# Patient Record
Sex: MALE | Race: WHITE | NOT HISPANIC OR LATINO | Employment: FULL TIME | URBAN - METROPOLITAN AREA
[De-identification: names, ages, dates, MRNs, and addresses within clinical notes are randomized per-mention and may not be internally consistent; named-entity substitution may affect disease eponyms.]

---

## 2017-09-06 ENCOUNTER — HISTORICAL (OUTPATIENT)
Dept: LAB | Facility: HOSPITAL | Age: 57
End: 2017-09-06

## 2017-09-06 LAB
CREAT UR-MCNC: 33.9 MG/DL
PROT UR STRIP-MCNC: 5.1 MG/DL

## 2020-10-20 ENCOUNTER — HISTORICAL (OUTPATIENT)
Dept: ADMINISTRATIVE | Facility: HOSPITAL | Age: 60
End: 2020-10-20

## 2020-10-20 LAB
ABS NEUT (OLG): 2.8 X10(3)/MCL (ref 2.1–9.2)
ALBUMIN SERPL-MCNC: 4.4 GM/DL (ref 3.4–5)
ALBUMIN/GLOB SERPL: 1.52 {RATIO} (ref 1.5–2.5)
ALP SERPL-CCNC: 66 UNIT/L (ref 38–126)
ALT SERPL-CCNC: 43 UNIT/L (ref 7–52)
AST SERPL-CCNC: 27 UNIT/L (ref 15–37)
BILIRUB SERPL-MCNC: 0.7 MG/DL (ref 0.2–1)
BILIRUBIN DIRECT+TOT PNL SERPL-MCNC: 0.1 MG/DL (ref 0–0.5)
BILIRUBIN DIRECT+TOT PNL SERPL-MCNC: 0.6 MG/DL
BUN SERPL-MCNC: 11 MG/DL (ref 7–18)
CALCIUM SERPL-MCNC: 9.5 MG/DL (ref 8.5–10.1)
CHLORIDE SERPL-SCNC: 101 MMOL/L (ref 98–107)
CHOLEST SERPL-MCNC: 235 MG/DL (ref 0–200)
CHOLEST/HDLC SERPL: 7.1 {RATIO}
CO2 SERPL-SCNC: 26 MMOL/L (ref 21–32)
CREAT SERPL-MCNC: 0.88 MG/DL (ref 0.6–1.3)
ERYTHROCYTE [DISTWIDTH] IN BLOOD BY AUTOMATED COUNT: 12.1 % (ref 11.5–17)
EST. AVERAGE GLUCOSE BLD GHB EST-MCNC: 126 MG/DL
GLOBULIN SER-MCNC: 2.9 GM/DL (ref 1.2–3)
GLUCOSE SERPL-MCNC: 97 MG/DL (ref 74–106)
HBA1C MFR BLD: 6 % (ref 4.4–6.4)
HCT VFR BLD AUTO: 42.5 % (ref 42–52)
HDLC SERPL-MCNC: 33 MG/DL (ref 35–60)
HGB BLD-MCNC: 14.3 GM/DL (ref 14–18)
LDLC SERPL CALC-MCNC: 149 MG/DL (ref 0–129)
LYMPHOCYTES # BLD AUTO: 2.1 X10(3)/MCL (ref 0.6–3.4)
LYMPHOCYTES NFR BLD AUTO: 40.9 % (ref 13–40)
MCH RBC QN AUTO: 29.2 PG (ref 27–31.2)
MCHC RBC AUTO-ENTMCNC: 34 GM/DL (ref 32–36)
MCV RBC AUTO: 87 FL (ref 80–94)
MONOCYTES # BLD AUTO: 0.2 X10(3)/MCL (ref 0.1–1.3)
MONOCYTES NFR BLD AUTO: 4.4 % (ref 0.1–24)
NEUTROPHILS NFR BLD AUTO: 54.7 % (ref 47–80)
PLATELET # BLD AUTO: 242 X10(3)/MCL (ref 130–400)
PMV BLD AUTO: 8.4 FL (ref 9.4–12.4)
POTASSIUM SERPL-SCNC: 4.1 MMOL/L (ref 3.5–5.1)
PROT SERPL-MCNC: 7.3 GM/DL (ref 6.4–8.2)
PSA SERPL-MCNC: 1.73 NG/ML (ref 0–3.5)
RBC # BLD AUTO: 4.9 X10(6)/MCL (ref 4.7–6.1)
SODIUM SERPL-SCNC: 136 MMOL/L (ref 136–145)
TRIGL SERPL-MCNC: 241 MG/DL (ref 30–150)
TSH SERPL-ACNC: 1.41 MIU/ML (ref 0.35–4.94)
VLDLC SERPL CALC-MCNC: 48.2 MG/DL
WBC # SPEC AUTO: 5.1 X10(3)/MCL (ref 4.5–11.5)

## 2021-01-18 ENCOUNTER — HISTORICAL (OUTPATIENT)
Dept: ADMINISTRATIVE | Facility: HOSPITAL | Age: 61
End: 2021-01-18

## 2021-01-18 LAB
ALBUMIN SERPL-MCNC: 4.4 GM/DL (ref 3.4–5)
ALBUMIN/GLOB SERPL: 1.63 {RATIO} (ref 1.5–2.5)
ALP SERPL-CCNC: 71 UNIT/L (ref 38–126)
ALT SERPL-CCNC: 39 UNIT/L (ref 7–52)
AST SERPL-CCNC: 25 UNIT/L (ref 15–37)
BILIRUB SERPL-MCNC: 0.6 MG/DL (ref 0.2–1)
BILIRUBIN DIRECT+TOT PNL SERPL-MCNC: 0.1 MG/DL (ref 0–0.5)
BILIRUBIN DIRECT+TOT PNL SERPL-MCNC: 0.5 MG/DL
BUN SERPL-MCNC: 9 MG/DL (ref 7–18)
CALCIUM SERPL-MCNC: 9.3 MG/DL (ref 8.5–10.1)
CHLORIDE SERPL-SCNC: 104 MMOL/L (ref 98–107)
CHOLEST SERPL-MCNC: 118 MG/DL (ref 0–200)
CHOLEST/HDLC SERPL: 3.6 {RATIO}
CO2 SERPL-SCNC: 26 MMOL/L (ref 21–32)
CREAT SERPL-MCNC: 0.85 MG/DL (ref 0.6–1.3)
GLOBULIN SER-MCNC: 2.7 GM/DL (ref 1.2–3)
GLUCOSE SERPL-MCNC: 135 MG/DL (ref 74–106)
HDLC SERPL-MCNC: 33 MG/DL (ref 35–60)
LDLC SERPL CALC-MCNC: 68 MG/DL (ref 0–129)
POTASSIUM SERPL-SCNC: 4 MMOL/L (ref 3.5–5.1)
PROT SERPL-MCNC: 7.1 GM/DL (ref 6.4–8.2)
SODIUM SERPL-SCNC: 138 MMOL/L (ref 136–145)
TRIGL SERPL-MCNC: 146 MG/DL (ref 30–150)
VLDLC SERPL CALC-MCNC: 29.2 MG/DL

## 2021-07-19 ENCOUNTER — HISTORICAL (OUTPATIENT)
Dept: ADMINISTRATIVE | Facility: HOSPITAL | Age: 61
End: 2021-07-19

## 2021-07-19 LAB
ALBUMIN SERPL-MCNC: 4.5 GM/DL (ref 3.4–5)
ALBUMIN/GLOB SERPL: 1.61 {RATIO} (ref 1.5–2.5)
ALP SERPL-CCNC: 114 UNIT/L (ref 38–126)
ALT SERPL-CCNC: 60 UNIT/L (ref 7–52)
AST SERPL-CCNC: 33 UNIT/L (ref 15–37)
BILIRUB SERPL-MCNC: 0.6 MG/DL (ref 0.2–1)
BILIRUBIN DIRECT+TOT PNL SERPL-MCNC: 0.1 MG/DL (ref 0–0.5)
BILIRUBIN DIRECT+TOT PNL SERPL-MCNC: 0.5 MG/DL
BUN SERPL-MCNC: 10 MG/DL (ref 7–18)
CALCIUM SERPL-MCNC: 9.4 MG/DL (ref 8.5–10.1)
CHLORIDE SERPL-SCNC: 101 MMOL/L (ref 98–107)
CHOLEST SERPL-MCNC: 123 MG/DL (ref 0–200)
CHOLEST/HDLC SERPL: 3.8 {RATIO}
CO2 SERPL-SCNC: 27 MMOL/L (ref 21–32)
CREAT SERPL-MCNC: 0.83 MG/DL (ref 0.6–1.3)
EST. AVERAGE GLUCOSE BLD GHB EST-MCNC: 143 MG/DL
GLOBULIN SER-MCNC: 2.8 GM/DL (ref 1.2–3)
GLUCOSE SERPL-MCNC: 141 MG/DL (ref 74–106)
HBA1C MFR BLD: 6.6 % (ref 4.4–6.4)
HDLC SERPL-MCNC: 32 MG/DL (ref 35–60)
LDLC SERPL CALC-MCNC: 71 MG/DL (ref 0–129)
POTASSIUM SERPL-SCNC: 4.5 MMOL/L (ref 3.5–5.1)
PROT SERPL-MCNC: 7.3 GM/DL (ref 6.4–8.2)
SODIUM SERPL-SCNC: 136 MMOL/L (ref 136–145)
TRIGL SERPL-MCNC: 138 MG/DL (ref 30–150)
VLDLC SERPL CALC-MCNC: 27.6 MG/DL

## 2022-01-19 LAB — EST CREAT CLEARANCE SER (OHS): 130.34 ML/MIN

## 2022-04-10 ENCOUNTER — HISTORICAL (OUTPATIENT)
Dept: ADMINISTRATIVE | Facility: HOSPITAL | Age: 62
End: 2022-04-10

## 2022-04-27 VITALS
DIASTOLIC BLOOD PRESSURE: 84 MMHG | OXYGEN SATURATION: 96 % | WEIGHT: 209.44 LBS | SYSTOLIC BLOOD PRESSURE: 131 MMHG | BODY MASS INDEX: 31.02 KG/M2 | HEIGHT: 69 IN

## 2022-05-03 NOTE — HISTORICAL [facility]ER
This is a historical note converted from Pedro. Formatting and pictures may have been removed.  Please reference Pedro for original formatting and attached multimedia. Chief Complaint  6 MTH F/U  History of Present Illness  History of prediabetes.? Hemoglobin A1c 6.0?in October.  Hypercholesterolemia- LDL 68 in January with Crstor 20 mg daily.?? CT calcium score performed 11/23/20??which was 611 placing patient above the 90th percentile.??Pt evaluated by cardiologist, Dr. Calderon in December.? Echo, carotid us, LE arterial US and treadmill stress testing.? No significant concerns noted.? Overall diet has?improved to be more heart healthy.? Tolerating Crestor well without any side effects.?  Hypertension?controlled with?amlodipine 10 mg daily and metoprolol  mg daily.  Review of Systems  Constitutional:?No weight loss, no fever, no fatigue, no chills, no night sweats,?no weakness  Eyes:?No blurred vision,?no redness,?no drainage,?no ocular?pain  HEENT:?No sore throat,?no ear pain, no sinus pressure, no nasal congestion, no rhinorrhea, no postnasal drip  Respiratory:?No cough, no wheezing, no sputum production, no shortness of breath  Cardiovascular:?No chest pain, no palpitations, no dyspnea on exertion,?no orthopnea  Gastrointestinal:?No nausea, no vomiting, no abdominal pain, no diarrhea,?no constipation, no melena,?no hematochezia  Genitourinary:?No dysuria, no hematuria, no frequency, no urgency, no incontinence, no discharge  Musculoskeletal:?No myalgias, no arthralgias, no weakness, no joint effusion, no edema  Integumentary:?No rashes, no hives, no itching, no lesions, no jaundice  Neurologic:?No headaches, no numbness, no tingling, no weakness, no dizziness  Psychiatric:?No anxiety, no irritability, no depression,?no suicidal ideations, no?homicidal ideations,?no delusions, no hallucinations  Endocrine:?No polyuria, no polydipsia, no polyphagia  Hematology:?No bruising, no lymphadenopathy,?no  paleness  ?  Physical Exam  Vitals & Measurements  HR:?82(Peripheral)? BP:?125/86? SpO2:?96%?  HT:?175.00?cm? WT:?98.300?kg? BMI:?32.1?  General:?Well developed, Well-nourished, in No acute distress, A&O x 4  Cardiovascular:?Regular rate and rhythm, No murmurs, No gallops, No rubs  Respiratory:?Clear to auscultation bilaterally, No wheezes, No rhonchi, No?crackles  Abdomen:? Soft, Nontender, No hepatosplenomegaly, Normal and equal bowel sounds, No masses, No rebound, No guarding  Musculoskeletal:? No tenderness, FROM, No joint abnormality, No clubbing, No cyanosis,No?edema  Integumentary:?No rashes or skin lesions  Assessment/Plan  1.?HTN (hypertension)?I10  ?Continue?metoprolol  mg daily and amlodipine 10 mg daily  Ordered:  Clinic Follow up, *Est. 01/19/22 3:00:00 CST, Wellness Physical, Order for future visit, HTN (hypertension)  Hypercholesterolemia  Elevated coronary artery calcium score  Insulin resistance, HLink AFP  Comprehensive Metabolic Panel, Routine collect, 07/19/21 9:55:00 CDT, Blood, Stop date 07/19/21 9:55:00 CDT, Lab Collect, HTN (hypertension), J Luis Calderon MD, 07/19/21 9:55:00 CDT  Office/Outpatient Visit Level 4 Established 47951 PC, HTN (hypertension)  Hypercholesterolemia  Elevated coronary artery calcium score  Insulin resistance, HLINK AMB - AFP, 07/19/21 9:49:00 CDT  ?  2.?Hypercholesterolemia?E78.00  Ordered:  Clinic Follow up, *Est. 01/19/22 3:00:00 CST, Wellness Physical, Order for future visit, HTN (hypertension)  Hypercholesterolemia  Elevated coronary artery calcium score  Insulin resistance, HLink AFP  Lipid Panel, Routine collect, 07/19/21 9:55:00 CDT, Blood, Stop date 07/19/21 9:55:00 CDT, Lab Collect, Hypercholesterolemia, J Luis Calderon MD, 07/19/21 9:55:00 CDT  Office/Outpatient Visit Level 4 Established 00169 PC, HTN (hypertension)  Hypercholesterolemia  Elevated coronary artery calcium score  Insulin resistance, HLINK AMB - AFP, 07/19/21 9:49:00  CDT  ?  3.?Elevated coronary artery calcium score?R93.1  ?Continue risk factor reduction?and routine cardiology follow-up  Ordered:  Clinic Follow up, *Est. 01/19/22 3:00:00 CST, Wellness Physical, Order for future visit, HTN (hypertension)  Hypercholesterolemia  Elevated coronary artery calcium score  Insulin resistance, HLink AFP  Office/Outpatient Visit Level 4 Established [id number] PC, HTN (hypertension)  Hypercholesterolemia  Elevated coronary artery calcium score  Insulin resistance, HLINK AMB - AFP, 07/19/21 9:49:00 CDT  ?  4.?Insulin resistance?E88.81  ?Continue efforts to maintain a low carbohydrate diet and regular exercise  Ordered:  Clinic Follow up, *Est. 01/19/22 3:00:00 CST, Wellness Physical, Order for future visit, HTN (hypertension)  Hypercholesterolemia  Elevated coronary artery calcium score  Insulin resistance, HLink AFP  Hemoglobin A1c, Routine collect, 07/19/21 9:55:00 CDT, Blood, Stop date 07/19/21 9:55:00 CDT, Lab Collect, Insulin resistance, Kvng JC, J Luis, 07/19/21 9:55:00 CDT  Office/Outpatient Visit Level 4 Established 51979 PC, HTN (hypertension)  Hypercholesterolemia  Elevated coronary artery calcium score  Insulin resistance, HLINK AMB - AFP, 07/19/21 9:49:00 CDT  ?  Referrals  Clinic Follow up, *Est. 01/19/22 3:00:00 CST, Wellness Physical, Order for future visit, HTN (hypertension)  Hypercholesterolemia  Elevated coronary artery calcium score  Insulin resistance, HLink AFP  Clinic Follow up, Order for future visit   Problem List/Past Medical History  Ongoing  Elevated coronary artery calcium score  HTN (hypertension)  Hypercholesterolemia  Insulin resistance  Historical  SBO - Small bowel obstruction  Procedure/Surgical History  Colonoscopy (02/16/2016)  Cholecystectomy (08.2014)   Medications  amLODIPine 10 mg oral tablet, See Instructions  aspirin 81 mg oral Delayed Release (EC) tablet, 81 mg= 1 tab(s), Oral, Daily  Metoprolol Succinate  mg oral tablet, extended  release, See Instructions  rosuvastatin 20 mg oral tablet, 20 mg= 1 tab(s), Oral, Daily, 3 refills  Triple Strength Red Krill Oil, 1000 mg, Oral, BID  Vitamin C 250 mg oral tablet, 250 mg= 1 tab(s), Oral, Daily  Vitamin D3  Zinc, 140 mg, Oral, Daily  Allergies  lisinopril?(COUGH)  Social History  Abuse/Neglect  No, 07/19/2021  Alcohol - Denies Alcohol Use, 01/07/2016  Current, Beer, 1-2 times per month, Alcohol use interferes with work or home: No. Others hurt by drinking: No. Household alcohol concerns: No., 10/20/2020  Employment/School  Employed, Work/School description: ., 09/25/2018  Substance Use - Denies Substance Abuse, 01/07/2016  Tobacco - Denies Tobacco Use, 01/07/2016  Former smoker, quit more than 30 days ago, Cigarettes, No, 20 per day. 33 year(s). 17 Years (Age started). 48 Years (Age stopped)., 07/19/2021  Family History  Acute myocardial infarction.: Father.  CVA - Cerebrovascular accident: Father.  Cataract.: Mother.  Chronic kidney disease: Father.  Diabetes mellitus type 2: Father, Sister and Brother.  Hyperlipidemia.: Father.  Hypertension.: Mother and Father.  Hypothyroidism.: Mother.  Migraine: Mother.  Peripheral vascular disease.: Father.  Prediabetes: Mother.  Immunizations  Vaccine Date Status   tetanus/diphtheria/pertussis, acel(Tdap) 08/01/2016 Recorded   Health Maintenance  Health Maintenance  ???Pending?(in the next year)  ??? ??OverDue  ??? ? ? ?Influenza Vaccine due??10/01/20??and every 1??day(s)  ??? ? ? ?Alcohol Misuse Screening due??01/02/21??and every 1??year(s)  ??? ??Due?  ??? ? ? ?ADL Screening due??07/19/21??and every 1??year(s)  ??? ? ? ?Depression Screening due??07/19/21??Unknown Frequency  ??? ? ? ?Zoster Vaccine due??07/19/21??Unknown Frequency  ??? ??Due In Future?  ??? ? ? ?Obesity Screening not due until??01/01/22??and every 1??year(s)  ??? ? ? ?Diabetes Screening not due until??01/18/22??and every 1??year(s)  ??? ? ? ?Hypertension Management-BMP not due  until??01/18/22??and every 1??year(s)  ??? ? ? ?Aspirin Therapy for CVD Prevention not due until??01/18/22??and every 1??year(s)  ???Satisfied?(in the past 1 year)  ??? ??Satisfied?  ??? ? ? ?Aspirin Therapy for CVD Prevention on??01/18/21.  ??? ? ? ?Blood Pressure Screening on??07/19/21.??Satisfied by Elian Acosta MD, Richard A  ??? ? ? ?Body Mass Index Check on??07/19/21.??Satisfied by Cristal Hyde LPN  ??? ? ? ?Coronary Artery Disease Maintenance-Antiplatelet Agent Prescribed on??01/18/21.  ??? ? ? ?Diabetes Screening on??01/18/21.??Satisfied by Guerrero Laird  ??? ? ? ?Hypertension Management-BMP on??01/18/21.??Satisfied by Guerrero Laird  ??? ? ? ?Hypertension Management-Blood Pressure on??07/19/21.??Satisfied by Elian Acosta MD, Richard A  ??? ? ? ?Influenza Vaccine on??01/18/21.??Satisfied by Cristal Hyde LPN  ??? ? ? ?Lipid Screening on??01/18/21.??Satisfied by Guerrero Laird  ??? ? ? ?Obesity Screening on??07/19/21.??Satisfied by Cristal Hyde LPN  ?

## 2022-05-03 NOTE — HISTORICAL [facility]
This is a historical note converted from Pedro. Formatting and pictures may have been removed.  Please reference Pedro for original formatting and attached multimedia. Chief Complaint  3 MTH F/U  History of Present Illness  Prediabetes - HbA1C 6.0 10/20/20  Hyperchol -  10/20/20 and ASCVD 13.13%.? Pt sent for CT calcium score performed 11/23/20??which was 611 placing patient above the 90th percentile.??Pt started on Crestor 20 mg daily. ?Also recommend taking aspirin 81 mg daily.? Pt saw cardiologist, Dr. Calderon in December.? Echo, carotid us, LE arterial US and treadmill stress testing.? No significant concerns noted.? Overall diet has?improved to be more heart healthy.? Tolerating Crestor well without any side effects.? Patient has began?cardiovascular exercise routine as well.  Hypertension?controlled with?amlodipine 10 mg daily and metoprolol  mg daily.  Review of Systems  Constitutional:?No weight loss, no fever, no fatigue, no chills, no night sweats,?no weakness  Eyes:?No blurred vision,?no redness,?no drainage,?no ocular?pain  HEENT:?No sore throat,?no ear pain, no sinus pressure, no nasal congestion, no rhinorrhea, no postnasal drip  Respiratory:?No cough, no wheezing, no sputum production, no shortness of breath  Cardiovascular:?No chest pain, no palpitations, no dyspnea on exertion,?no orthopnea  Gastrointestinal:?No nausea, no vomiting, no abdominal pain, no diarrhea,?no constipation, no melena,?no hematochezia  Genitourinary:?No dysuria, no hematuria, no frequency, no urgency, no incontinence, no discharge  Musculoskeletal:?No myalgias, no arthralgias, no weakness, no joint effusion, no edema  Integumentary:?No rashes, no hives, no itching, no lesions, no jaundice  Neurologic:?No headaches, no numbness, no tingling, no weakness, no dizziness  Psychiatric:?No anxiety, no irritability, no depression,?no suicidal ideations, no?homicidal ideations,?no delusions, no  hallucinations  Endocrine:?No polyuria, no polydipsia, no polyphagia  Hematology:?No bruising, no lymphadenopathy,?no paleness  ?  Physical Exam  Vitals & Measurements  HR:?72(Peripheral)? BP:?116/84? SpO2:?94%?  HT:?175.00?cm? WT:?97.100?kg? BMI:?31.71?  General:?Well developed, Well-nourished, in No acute distress, A&O x 4  Cardiovascular:?Regular rate and rhythm, No murmurs, No gallops, No rubs  Respiratory:?Clear to auscultation bilaterally, No wheezes, No rhonchi, No?crackles  Abdomen:? Soft, Nontender, No hepatosplenomegaly, Normal and equal bowel sounds, No masses, No rebound, No guarding  Musculoskeletal:? No tenderness, FROM, No joint abnormality, No clubbing, No cyanosis,No?edema  Integumentary:?No rashes or skin lesions  Assessment/Plan  1.?HTN (hypertension)?I10  ?Continue amlodipine 10 mg daily and metoprolol  mg daily  Ordered:  Clinic Follow up, *Est. 07/19/21 9:00:00 CDT, Order for future visit, Hypercholesterolemia, HLink AFP  Clinic Follow up, *Est. 07/18/21 3:00:00 CDT, Order for future visit, HTN (hypertension)  Hypercholesterolemia  Elevated coronary artery calcium score  Insulin resistance, HLink AFP  Office/Outpatient Visit Level 4 Established [id number] PC, HTN (hypertension)  Hypercholesterolemia  Elevated coronary artery calcium score  Insulin resistance, HLINK AMB - AFP, 01/18/21 10:11:00 CST  ?  2.?Hypercholesterolemia?E78.00  ?CMP and FLP today  Ordered:  Clinic Follow up, *Est. 07/19/21 9:00:00 CDT, Order for future visit, Hypercholesterolemia, HLink AFP  Clinic Follow up, *Est. 07/18/21 3:00:00 CDT, Order for future visit, HTN (hypertension)  Hypercholesterolemia  Elevated coronary artery calcium score  Insulin resistance, HLink AFP  Office/Outpatient Visit Level 4 Established 03600 PC, HTN (hypertension)  Hypercholesterolemia  Elevated coronary artery calcium score  Insulin resistance, HLINK AMB - AFP, 01/18/21 10:11:00 CST  ?  3.?Elevated coronary artery calcium  score?R93.1  ?Continue risk factor reduction  Continue aspirin 81 mg daily  Ordered:  Clinic Follow up, *Est. 07/19/21 9:00:00 CDT, Order for future visit, Hypercholesterolemia, HLink AFP  Clinic Follow up, *Est. 07/18/21 3:00:00 CDT, Order for future visit, HTN (hypertension)  Hypercholesterolemia  Elevated coronary artery calcium score  Insulin resistance, HLink AFP  Office/Outpatient Visit Level 4 Established [id number] PC, HTN (hypertension)  Hypercholesterolemia  Elevated coronary artery calcium score  Insulin resistance, HLINK AMB - AFP, 01/18/21 10:11:00 CST  ?  4.?Insulin resistance?E88.81  ?Continue efforts to maintain low carbohydrate diet and regular exercise  Ordered:  Clinic Follow up, *Est. 07/19/21 9:00:00 CDT, Order for future visit, Hypercholesterolemia, HLink AFP  Clinic Follow up, *Est. 07/18/21 3:00:00 CDT, Order for future visit, HTN (hypertension)  Hypercholesterolemia  Elevated coronary artery calcium score  Insulin resistance, HLink AFP  Office/Outpatient Visit Level 4 Established [id number] PC, HTN (hypertension)  Hypercholesterolemia  Elevated coronary artery calcium score  Insulin resistance, HLINK AMB - AFP, 01/18/21 10:11:00 CST  ?  Referrals  Clinic Follow up, *Est. 07/19/21 9:00:00 CDT, Order for future visit, Hypercholesterolemia, HLink AFP  Clinic Follow up, *Est. 07/18/21 3:00:00 CDT, Order for future visit, HTN (hypertension)  Hypercholesterolemia  Elevated coronary artery calcium score  Insulin resistance, HLink AFP   Problem List/Past Medical History  Ongoing  Elevated coronary artery calcium score  HTN (hypertension)  Hypercholesterolemia  Insulin resistance  Historical  SBO - Small bowel obstruction  Procedure/Surgical History  Cholecystectomy (08.2014)   Medications  amLODIPine 10 mg oral tablet, See Instructions  aspirin 81 mg oral Delayed Release (EC) tablet, 81 mg= 1 tab(s), Oral, Daily  Crestor 20 mg oral tablet, 20 mg= 1 tab(s), Oral, Daily, 5 refills  Metoprolol  Succinate  mg oral tablet, extended release, See Instructions  Triple Strength Red Krill Oil, 1000 mg, Oral, BID  Vitamin C 250 mg oral tablet, 250 mg= 1 tab(s), Oral, Daily  Vitamin D3  Zinc, 140 mg, Oral, Daily  Allergies  lisinopril?(COUGH)  Social History  Abuse/Neglect  No, 01/18/2021  Alcohol - Denies Alcohol Use, 01/07/2016  Current, Beer, 1-2 times per month, Alcohol use interferes with work or home: No. Others hurt by drinking: No. Household alcohol concerns: No., 10/20/2020  Employment/School  Employed, Work/School description: ., 09/25/2018  Substance Use - Denies Substance Abuse, 01/07/2016  Tobacco - Denies Tobacco Use, 01/07/2016  Former smoker, quit more than 30 days ago, Cigarettes, No, 20 per day. 33 year(s). 17 Years (Age started). 48 Years (Age stopped)., 01/18/2021  Family History  Acute myocardial infarction.: Father.  CVA - Cerebrovascular accident: Father.  Cataract.: Mother.  Chronic kidney disease: Father.  Diabetes mellitus type 2: Father, Sister and Brother.  Hyperlipidemia.: Father.  Hypertension.: Mother and Father.  Hypothyroidism.: Mother.  Migraine: Mother.  Peripheral vascular disease.: Father.  Prediabetes: Mother.  Immunizations  Vaccine Date Status   tetanus/diphtheria/pertussis, acel(Tdap) 08/01/2016 Recorded   Health Maintenance  Health Maintenance  ???Pending?(in the next year)  ??? ??OverDue  ??? ? ? ?Colorectal Screening due??02/10/17??and every 1??year(s)  ??? ? ? ?Influenza Vaccine due??10/01/20??and every 1??day(s)  ??? ??Due?  ??? ? ? ?Alcohol Misuse Screening due??01/02/21??and every 1??year(s)  ??? ? ? ?ADL Screening due??01/18/21??and every 1??year(s)  ??? ? ? ?Depression Screening due??01/18/21??Unknown Frequency  ??? ? ? ?Zoster Vaccine due??01/18/21??Unknown Frequency  ??? ??Due In Future?  ??? ? ? ?Obesity Screening not due until??01/01/22??and every 1??year(s)  ???Satisfied?(in the past 1 year)  ??? ??Satisfied?  ??? ? ? ?Aspirin Therapy for CVD  Prevention on??01/18/21.  ??? ? ? ?Blood Pressure Screening on??01/18/21.??Satisfied by Elian Acosta MD, Richard A  ??? ? ? ?Body Mass Index Check on??01/18/21.??Satisfied by Cristal Hyde LPN  ??? ? ? ?Diabetes Screening on??01/18/21.??Satisfied by Guerrero Laird  ??? ? ? ?Hypertension Management-BMP on??01/18/21.??Satisfied by Guerrero Laird  ??? ? ? ?Hypertension Management-Blood Pressure on??01/18/21.??Satisfied by Elian Acosta MD, Richard A  ??? ? ? ?Influenza Vaccine on??01/18/21.??Satisfied by Cristal Hyde LPN  ??? ? ? ?Lipid Screening on??01/18/21.??Satisfied by Guerrero Laird  ??? ? ? ?Obesity Screening on??01/18/21.??Satisfied by Cristal Hyde LPN  ?

## 2022-08-01 PROBLEM — I25.84 CALCIFICATION OF CORONARY ARTERY: Status: ACTIVE | Noted: 2022-08-01

## 2022-08-01 PROBLEM — E11.9 TYPE 2 DIABETES MELLITUS: Status: ACTIVE | Noted: 2022-08-01

## 2022-08-01 PROBLEM — E78.00 HYPERCHOLESTEROLEMIA: Status: ACTIVE | Noted: 2022-08-01

## 2022-08-01 PROBLEM — I25.10 CALCIFICATION OF CORONARY ARTERY: Status: ACTIVE | Noted: 2022-08-01

## 2022-08-01 PROBLEM — I10 HYPERTENSION: Status: ACTIVE | Noted: 2022-08-01

## 2022-09-17 ENCOUNTER — HISTORICAL (OUTPATIENT)
Dept: ADMINISTRATIVE | Facility: HOSPITAL | Age: 62
End: 2022-09-17

## 2022-09-24 NOTE — HISTORICAL [facility]
This is a historical note converted from Pedro. Formatting and pictures may have been removed.  Please reference Pedro for original formatting and attached multimedia. Chief Complaint  CPX FASTING  History of Present Illness  60?year old male presents for wellness visit.  Blood Pressure -152/102 repeat 125/94  BMI -31.51  Immunizations - Declines flu vaccine and Shingrix  Prostate?Cancer Screening -PSA and GARFIELD today  Colon Cancer Screening - screening colonoscopy 2/16/16?with Dr. Belle repeat 3 years recommended  Diet - average  Exercise -??yard work intermittently  Hypertension previously controlled with amlodipine and Metoprolol. ?Tolerating well without any side effects.?  History of prediabetes.? HbA1C 6.2 9/25/18.??  Acute low left?back pain with lifting yesterday.? Radiates to left thigh. ?Denies any weakness or numbness.  Review of Systems  Constitutional:?No weight loss, no fever, no fatigue, no chills, no night sweats,?no weakness  Eyes:?No blurred vision,?no redness,?no drainage,?no ocular?pain  HEENT:?No sore throat,?no ear pain, no sinus pressure, no nasal congestion, no rhinorrhea, no postnasal drip  Respiratory:?No cough, no wheezing, no sputum production, no shortness of breath  Cardiovascular:?No chest pain, no palpitations, no dyspnea on exertion,?no orthopnea  Gastrointestinal:?No nausea, no vomiting, no abdominal pain, no diarrhea,?no constipation, no melena,?no hematochezia  Genitourinary:?No dysuria, no hematuria, no frequency, no urgency, no incontinence, no discharge  Musculoskeletal:?myalgias, no arthralgias, no weakness, no joint effusion, no edema  Integumentary:?No rashes, no hives, no itching, no lesions, no jaundice  Neurologic:?No headaches, no numbness, no tingling, no weakness, no dizziness  Psychiatric:?No anxiety, no irritability, no depression,?no suicidal ideations, no?homicidal ideations,?no delusions, no hallucinations  Endocrine:?No polyuria, no polydipsia, no  polyphagia  Hematology:?No bruising, no lymphadenopathy,?no paleness  ?  Physical Exam  Vitals & Measurements  HR:?70(Peripheral)? BP:?125/94?  HT:?175.00?cm? WT:?96.500?kg? BMI:?31.51?  General:?Well developed, Well-nourished, in No acute distress, A&O x 4  Eye:?PERRLA, EOMI, Clear conjunctiva, Eyelids unremarkable  Ears:?Bilateral EAC clear?and?Bilateral TM clear  Nose:? Mucosa normal, No rhinorrhea, No lesions  O/P:??No?erythema,?No tonsillar hypertrophy,?No tonsillar exudates,?No cobblestoning  Neck:?Soft, Nontender, No thyromegaly, Full range of motion, No cervical lymphadenopathy, No JVD  Cardiovascular:?Regular rate and rhythm, No murmurs, No gallops, No rubs  Respiratory:?Clear to auscultation bilaterally, No wheezes, No rhonchi, No?crackles  Abdomen:? Soft, Nontender, No hepatosplenomegaly, Normal and equal bowel sounds, No masses, No rebound, No guarding  Musculoskeletal:?Left-sided lower lumbar paraspinal tenderness, FROM, No joint abnormality, No clubbing, No cyanosis,No?edema  Neurologic:? No motor or sensory deficits, Reflexes 2+ throughout, CN II-XII grossly intact, positive straight leg raise on the left  Integumentary:?No rashes or skin lesions  ?GARFIELD - No anal growths or lesions, No rectal masses, Normal sphincter tone, No prostate nodules,? Symmetric, No tenderness,? prostate firm, prostate cath39b  Assessment/Plan  1.?Wellness examination?Z00.00  ?Discussed the?importance of maintaining a balanced diet and regular exercise  CBC, CMP, FLP, TSH today  Ordered:  Comprehensive Metabolic Panel, Routine collect, 10/20/20 15:09:00 CDT, Blood, Stop date 10/20/20 15:09:00 CDT, Lab Collect, Wellness examination, 10/20/20 15:09:00 CDT  Lipid Panel, Routine collect, 10/20/20 15:09:00 CDT, Blood, Stop date 10/20/20 15:09:00 CDT, Lab Collect, Wellness examination, 10/20/20 15:09:00 CDT  Preventative Health Care Est 40-64 years [id number] PC, Wellness examination, [facility] AM[facility] - AFP, 10/20/20 14:53:00 CDT  Thyroid  Stimulating Hormone, Routine collect, 10/20/20 15:09:00 CDT, Blood, Stop date 10/20/20 15:09:00 CDT, Lab Collect, Wellness examination, 10/20/20 15:09:00 CDT  ?  2.?HTN (hypertension)?I10  ?Continue amlodipine 10 mg daily and metoprolol  mg daily  Patient to monitor blood pressure and will send in blood pressure log in 2 weeks  Ordered:  Office/Outpatient Visit Level 3 Established [id number] PC, Low back pain  Insulin resistance  HTN (hypertension), [facility] AMB - AFP, 10/20/20 15:02:00 CDT  ?  3.?Insulin resistance?E88.81  ?Hemoglobin A1c today  Ordered:  Office/Outpatient Visit Level 3 Established 33479 PC, Low back pain  Insulin resistance  HTN (hypertension), [facility] AMB - AFP, 10/20/20 15:02:00 CDT  ?  4.?Low back pain?M54.5  ?Toradol 60 mg IM today  Meloxicam 15 mg daily  Flexeril 10 mg at bedtime as needed  If symptoms fail to improve recommend physical therapy  Ordered:  Office/Outpatient Visit Level 3 Established 61046 PC, Low back pain  Insulin resistance  HTN (hypertension), [facility] AM[facility] - AFP, 10/20/20 15:02:00 CDT  ?  5.?Prostate cancer screening?Z12.5  ?GARFIELD today  Ordered:  Prostate Specific Antigen, Routine collect, 10/20/20 15:09:00 CDT, Blood, Stop date 10/20/20 15:09:00 CDT, Lab Collect, Prostate cancer screening, 10/20/20 15:09:00 CDT  ?  Orders:  cyclobenzaprine, 10 mg = 1 tab(s), Oral, At Bedtime, Use as needed for muscle spasm/pain, may cause sedation, X 10 day(s), # 10 tab(s), 0 Refill(s), Pharmacy: [facility], 175, cm, Height/Length Dosing, 10/20/20 14:10:00 CDT, 96.5, kg, Weight Dosing, 10/...  meloxicam, 15 mg = 1 tab(s), Oral, Daily, # 30 tab(s), 0 Refill(s), Pharmacy: [facility] [place], 175, cm, Height/Length Dosing, 10/20/20 14:10:00 CDT, 96.5, kg, Weight Dosing, 10/20/20 14:10:00 CDT   Problem List/Past Medical History  Ongoing  HTN (hypertension)  Insulin resistance  Historical  SBO - Small bowel obstruction  Procedure/Surgical History  Cholecystectomy (08.2014)    Medications  amlodipine 10 mg oral tablet, See Instructions  cyclobenzaprine 10 mg oral tablet, 10 mg= 1 tab(s), Oral, At Bedtime  Fish Oil oral capsule, 1 cap(s), Oral, Daily  meloxicam 15 mg oral tablet, 15 mg= 1 tab(s), Oral, Daily  Metoprolol Succinate  mg oral tablet extended release, See Instructions  Vitamin C 250 mg oral tablet, 250 mg= 1 tab(s), Oral, Daily  Zinc, 140 mg, Oral, Daily  Allergies  lisinopril?(COUGH)  Social History  Abuse/Neglect  No, 10/20/2020  Alcohol - Denies Alcohol Use, 01/07/2016  Current, Beer, 1-2 times per month, Alcohol use interferes with work or home: No. Others hurt by drinking: No. Household alcohol concerns: No., 10/20/2020  Employment/School  Employed, Work/School description: ., 09/25/2018  Substance Use - Denies Substance Abuse, 01/07/2016  Tobacco - Denies Tobacco Use, 01/07/2016  Former smoker, quit more than 30 days ago, Cigarettes, No, 20 per day. 33 year(s). 17 Years (Age started). 48 Years (Age stopped)., 10/20/2020  Family History  Acute myocardial infarction.: Father.  CVA - Cerebrovascular accident: Father.  Cataract.: Mother.  Chronic kidney disease: Father.  Diabetes mellitus type 2: Father, Sister and Brother.  Hyperlipidemia.: Father.  Hypertension.: Mother and Father.  Hypothyroidism.: Mother.  Migraine: Mother.  Peripheral vascular disease.: Father.  Prediabetes: Mother.  Immunizations  Vaccine Date Status   tetanus/diphtheria/pertussis, acel(Tdap) 08/01/2016 Recorded   Health Maintenance  Health Maintenance  ???Pending?(in the next year)  ??? ??OverDue  ??? ? ? ?Colorectal Screening due??02/10/17??and every 1??year(s)  ??? ? ? ?Hypertension Management-BMP due??09/25/19??and every 1??year(s)  ??? ? ? ?Influenza Vaccine due??10/01/19??and every 1??day(s)  ??? ? ? ?Alcohol Misuse Screening due??01/02/20??and every 1??year(s)  ??? ??Due?  ??? ? ? ?ADL Screening due??10/20/20??and every 1??year(s)  ??? ? ? ?Aspirin Therapy for CVD Prevention  due??10/20/20??and every 1??year(s)  ??? ? ? ?Depression Screening due??10/20/20??and every?  ??? ? ? ?Zoster Vaccine due??10/20/20??and every?  ??? ??Due In Future?  ??? ? ? ?Obesity Screening not due until??01/01/21??and every 1??year(s)  ???Satisfied?(in the past 1 year)  ??? ??Satisfied?  ??? ? ? ?Blood Pressure Screening on??10/20/20.??Satisfied by Elian Acosta MD, Richard A  ??? ? ? ?Body Mass Index Check on??10/20/20.??Satisfied by Cristal Hyde LPN  ??? ? ? ?Diabetes Screening on??10/20/20.??Satisfied by Guerrero Laird  ??? ? ? ?Hypertension Management-Blood Pressure on??10/20/20.??Satisfied by Elian Acosta MD, Richard A  ??? ? ? ?Obesity Screening on??10/20/20.??Satisfied by Cristal Hyde LPN  ?      Pt presents to ED complaining of HA with sinus congestion for four days. Endorses hx of migraines.

## 2024-01-25 PROBLEM — Z12.11 COLON CANCER SCREENING: Status: ACTIVE | Noted: 2024-01-25

## 2024-12-12 ENCOUNTER — TELEPHONE (OUTPATIENT)
Dept: PHARMACY | Facility: CLINIC | Age: 64
End: 2024-12-12

## 2024-12-12 NOTE — TELEPHONE ENCOUNTER
[facility]Population [facility] Chart Review & Patient Outreach Details For Medication Adherence Project    Reason for Outreach Encounter: 3rd Party payor non-compliance report ([employer], [employer], [employer], etc)  2.  Patient Outreach Method: Reviewed patient chart   3.   Medication in question:  Rosuvastatin     Rosuvastatin  last filled  10/23/24 for 90 day supply      4.  Reviewed and or Updates Made To: Patient Chart  5. Outreach Outcomes and/or actions taken: Patient filled medication and is on track to be adherent  Additional Notes:

## 2025-01-08 ENCOUNTER — TELEPHONE (OUTPATIENT)
Dept: PHARMACY | Facility: CLINIC | Age: 65
End: 2025-01-08

## 2025-01-08 NOTE — TELEPHONE ENCOUNTER
[facility] Chart Review & Patient Outreach Details For Medication Adherence Project    Reason for Outreach Encounter: 3rd Party payor non-compliance report ([employer], [employer], [employer], etc)  2.  Patient Outreach Method: Reviewed patient chart   3.   Medication in question:    Hyperlipidemia Medications               rosuvastatin (CRESTOR) 20 MG tablet TAKE ONE TABLET BY MOUTH EVERY DAY                    last filled  10/23/24 for 90 day supply      4.  Reviewed and or Updates Made To: Patient Chart  5. Outreach Outcomes and/or actions taken: Patient filled medication and is on track to be adherent  Additional Notes:

## 2025-02-07 ENCOUNTER — TELEPHONE (OUTPATIENT)
Dept: PHARMACY | Facility: CLINIC | Age: 65
End: 2025-02-07

## 2025-02-07 NOTE — TELEPHONE ENCOUNTER
[facility] Chart Review & Patient Outreach Details For Medication Adherence Project    Reason for Outreach Encounter: 3rd Party payor non-compliance report ([employer], [employer], C, etc)  2.  Patient Outreach Method: Reviewed patient chart   3.   Medication in question:    Diabetes Medications               metFORMIN (GLUCOPHAGE-XR) 500 MG ER 24hr tablet TAKE 2 TABLETS (1,000MG) BY MOUTH EVERY EVENING.                 metformin  last filled  12/27 for 90 day supply      4.  Reviewed and or Updates Made To: Patient Chart  5. Outreach Outcomes and/or actions taken: Patient filled medication and is on track to be adherent  Additional Notes:

## 2025-02-21 ENCOUNTER — TELEPHONE (OUTPATIENT)
Dept: PHARMACY | Facility: CLINIC | Age: 65
End: 2025-02-21

## 2025-02-21 NOTE — TELEPHONE ENCOUNTER
[facility] Chart Review & Patient Outreach Details For Medication Adherence Project    Reason for Outreach Encounter: 3rd Party payor non-compliance report ([employer], [employer], [employer], etc)  2.  Patient Outreach Method: Reviewed patient chart  and [employer] message  3.   Medication in question:    Hyperlipidemia Medications              rosuvastatin (CRESTOR) 20 MG tablet TAKE ONE TABLET BY MOUTH EVERY DAY                  rosuvastatin  last filled  10/23 for 90 day supply      4.  Reviewed and or Updates Made To: Patient Chart  5. Outreach Outcomes and/or actions taken: Sent inquiry to patient: Waiting for response  Additional Notes:

## 2025-04-25 ENCOUNTER — TELEPHONE (OUTPATIENT)
Dept: PHARMACY | Facility: CLINIC | Age: 65
End: 2025-04-25

## 2025-04-25 NOTE — TELEPHONE ENCOUNTER
[facility] Chart Review & Patient Outreach Details For Medication Adherence Project    Reason for Outreach Encounter: 3rd Party payor non-compliance report ([employer], [employer], [employer], etc)  2.  Patient Outreach Method: Reviewed patient chart   3.   Medication in question:    Hyperlipidemia Medications              rosuvastatin (CRESTOR) 20 MG tablet TAKE ONE TABLET BY MOUTH EVERY DAY                  LF 90 ds 2/24/25    4.  Reviewed and or Updates Made To: Patient Chart  5. Outreach Outcomes and/or actions taken: Patient filled medication and is on track to be adherent  Additional Notes:

## 2025-06-20 ENCOUNTER — TELEPHONE (OUTPATIENT)
Dept: PHARMACY | Facility: CLINIC | Age: 65
End: 2025-06-20

## 2025-06-20 NOTE — TELEPHONE ENCOUNTER
[facility] Chart Review & Patient Outreach Details For Medication Adherence Project    Reason for Outreach Encounter: 3rd Party payor non-compliance report ([employer], [employer], [facility], etc)  2.  Patient Outreach Method: [employer] message  3.   Medication in question: metformin   LAST FILLED: 12/27/24 for 90 day supply  Diabetes Medications              metFORMIN (GLUCOPHAGE-XR) 500 MG ER 24hr tablet TAKE 2 TABLETS (1,000MG) BY MOUTH EVERY EVENING.              4.  Reviewed and or Updates Made To: Patient Chart  5. Outreach Outcomes and/or actions taken: Sent inquiry to patient: Waiting for response.

## 2025-06-26 ENCOUNTER — TELEPHONE (OUTPATIENT)
Dept: PHARMACY | Facility: CLINIC | Age: 65
End: 2025-06-26

## 2025-06-26 NOTE — TELEPHONE ENCOUNTER
[facility] Chart Review & Patient Outreach Details For Medication Adherence Project    Reason for Outreach Encounter: 3rd Party payor non-compliance report ([employer], [employer], C, etc)  2.  Patient Outreach Method: Reviewed Patient Chart  3.   Medication in question: rosuvastatin    LAST FILLED: 6/23/25 for 90 day supply  Hyperlipidemia Medications              rosuvastatin (CRESTOR) 20 MG tablet TAKE ONE TABLET BY MOUTH EVERY DAY               4.  Reviewed and or Updates Made To: Patient Chart  5. Outreach Outcomes and/or actions taken: Patient filled medication and is on track to be adherent

## 2025-07-02 ENCOUNTER — TELEPHONE (OUTPATIENT)
Dept: PHARMACY | Facility: CLINIC | Age: 65
End: 2025-07-02